# Patient Record
Sex: FEMALE | Race: WHITE | URBAN - METROPOLITAN AREA
[De-identification: names, ages, dates, MRNs, and addresses within clinical notes are randomized per-mention and may not be internally consistent; named-entity substitution may affect disease eponyms.]

---

## 2019-08-29 ENCOUNTER — APPOINTMENT (RX ONLY)
Dept: URBAN - METROPOLITAN AREA CLINIC 96 | Facility: CLINIC | Age: 59
Setting detail: DERMATOLOGY
End: 2019-08-29

## 2019-08-29 DIAGNOSIS — L81.4 OTHER MELANIN HYPERPIGMENTATION: ICD-10-CM

## 2019-08-29 DIAGNOSIS — L82.1 OTHER SEBORRHEIC KERATOSIS: ICD-10-CM

## 2019-08-29 DIAGNOSIS — L57.0 ACTINIC KERATOSIS: ICD-10-CM

## 2019-08-29 DIAGNOSIS — D22 MELANOCYTIC NEVI: ICD-10-CM

## 2019-08-29 DIAGNOSIS — D18.0 HEMANGIOMA: ICD-10-CM

## 2019-08-29 PROBLEM — D48.5 NEOPLASM OF UNCERTAIN BEHAVIOR OF SKIN: Status: ACTIVE | Noted: 2019-08-29

## 2019-08-29 PROBLEM — D18.01 HEMANGIOMA OF SKIN AND SUBCUTANEOUS TISSUE: Status: ACTIVE | Noted: 2019-08-29

## 2019-08-29 PROBLEM — D22.5 MELANOCYTIC NEVI OF TRUNK: Status: ACTIVE | Noted: 2019-08-29

## 2019-08-29 PROCEDURE — 17000 DESTRUCT PREMALG LESION: CPT | Mod: 59

## 2019-08-29 PROCEDURE — 11102 TANGNTL BX SKIN SINGLE LES: CPT

## 2019-08-29 PROCEDURE — ? COUNSELING

## 2019-08-29 PROCEDURE — ? DEFER

## 2019-08-29 PROCEDURE — ? SUNSCREEN RECOMMENDATIONS

## 2019-08-29 PROCEDURE — 11103 TANGNTL BX SKIN EA SEP/ADDL: CPT

## 2019-08-29 PROCEDURE — ? LIQUID NITROGEN

## 2019-08-29 PROCEDURE — 17003 DESTRUCT PREMALG LES 2-14: CPT

## 2019-08-29 PROCEDURE — ? BIOPSY BY SHAVE METHOD

## 2019-08-29 PROCEDURE — 99202 OFFICE O/P NEW SF 15 MIN: CPT | Mod: 25

## 2019-08-29 ASSESSMENT — LOCATION DETAILED DESCRIPTION DERM
LOCATION DETAILED: NASAL TIP
LOCATION DETAILED: UPPER STERNUM
LOCATION DETAILED: NASAL DORSUM
LOCATION DETAILED: RIGHT DISTAL POSTERIOR UPPER ARM
LOCATION DETAILED: SUPERIOR MID FOREHEAD
LOCATION DETAILED: RIGHT CENTRAL MALAR CHEEK

## 2019-08-29 ASSESSMENT — LOCATION SIMPLE DESCRIPTION DERM
LOCATION SIMPLE: SUPERIOR FOREHEAD
LOCATION SIMPLE: RIGHT POSTERIOR UPPER ARM
LOCATION SIMPLE: CHEST
LOCATION SIMPLE: NOSE
LOCATION SIMPLE: RIGHT CHEEK

## 2019-08-29 ASSESSMENT — LOCATION ZONE DERM
LOCATION ZONE: TRUNK
LOCATION ZONE: FACE
LOCATION ZONE: NOSE
LOCATION ZONE: ARM

## 2019-09-07 ENCOUNTER — APPOINTMENT (RX ONLY)
Dept: URBAN - METROPOLITAN AREA CLINIC 96 | Facility: CLINIC | Age: 59
Setting detail: DERMATOLOGY
End: 2019-09-07

## 2019-09-07 PROBLEM — C44.612 BASAL CELL CARCINOMA OF SKIN OF RIGHT UPPER LIMB, INCLUDING SHOULDER: Status: ACTIVE | Noted: 2019-09-07

## 2019-09-07 PROCEDURE — ? EXCISION

## 2019-09-07 PROCEDURE — ? PRESCRIPTION

## 2019-09-07 PROCEDURE — 13121 CMPLX RPR S/A/L 2.6-7.5 CM: CPT | Mod: 79

## 2019-09-07 PROCEDURE — 11603 EXC TR-EXT MAL+MARG 2.1-3 CM: CPT | Mod: 79

## 2019-09-07 RX ORDER — CEFADROXIL 500 MG/1
CAPSULE ORAL
Qty: 14 | Refills: 0 | Status: ERX | COMMUNITY
Start: 2019-09-07

## 2019-09-07 RX ADMIN — CEFADROXIL: 500 CAPSULE ORAL at 00:00

## 2019-09-12 ENCOUNTER — APPOINTMENT (RX ONLY)
Dept: URBAN - METROPOLITAN AREA CLINIC 96 | Facility: CLINIC | Age: 59
Setting detail: DERMATOLOGY
End: 2019-09-12

## 2019-09-12 PROBLEM — D48.5 NEOPLASM OF UNCERTAIN BEHAVIOR OF SKIN: Status: ACTIVE | Noted: 2019-09-12

## 2019-09-12 PROCEDURE — ? BIOPSY BY SHAVE METHOD

## 2019-09-12 PROCEDURE — 11102 TANGNTL BX SKIN SINGLE LES: CPT | Mod: 79

## 2019-09-12 PROCEDURE — 11103 TANGNTL BX SKIN EA SEP/ADDL: CPT | Mod: 79

## 2019-09-12 NOTE — HPI: SKIN LESIONS
How Severe Is Your Skin Lesion?: moderate
Have Your Skin Lesions Been Treated?: not been treated
Is This A New Presentation, Or A Follow-Up?: Skin Lesions
Additional History: Hx of bcc

## 2019-09-12 NOTE — PROCEDURE: BIOPSY BY SHAVE METHOD
Billing Type: Third-Party Bill
Render In Bullet Format When Appropriate: No
Biopsy Method: Dermablade
Hemostasis: Drysol and Portable Heat Cautery
Was A Bandage Applied: Yes
Silver Nitrate Text: The wound bed was treated with silver nitrate after the biopsy was performed.
Body Location Override (Optional - Billing Will Still Be Based On Selected Body Map Location If Applicable): right lateral distal pretibial region
Dressing: bandage
Anesthesia Volume In Cc (Will Not Render If 0): 0.5
Type Of Destruction Used: Curettage
Electrodesiccation And Curettage Text: The wound bed was treated with electrodesiccation and curettage after the biopsy was performed.
Biopsy Type: H and E
Consent: Written consent was obtained and risks were reviewed including but not limited to scarring, infection, bleeding, scabbing, incomplete removal, nerve damage and allergy to anesthesia.
Electrodesiccation Text: The wound bed was treated with electrodesiccation after the biopsy was performed.
Depth Of Biopsy: dermis
Wound Care: Petrolatum
Notification Instructions: Patient will be notified of biopsy results. However, patient instructed to call the office if not contacted within 2 weeks.
Size Of Lesion In Cm: 0.9
Additional Anesthesia Volume In Cc (Will Not Render If 0): 0
Cryotherapy Text: The wound bed was treated with cryotherapy after the biopsy was performed.
Lab Facility: 3
Anesthesia Type: 1% lidocaine with epinephrine
Post-Care Instructions: I reviewed with the patient in detail post-care instructions. Patient is to keep the biopsy site dry overnight, and then apply vaseline twice daily until healed. Patient may apply hydrogen peroxide soaks to remove any crusting.
Detail Level: Detailed
Lab: 6
Curettage Text: The wound bed was treated with curettage after the biopsy was performed.
X Size Of Lesion In Cm: 0.6
Size Of Lesion In Cm: 0.7
Body Location Override (Optional - Billing Will Still Be Based On Selected Body Map Location If Applicable): Right distal pretibial

## 2019-09-20 ENCOUNTER — APPOINTMENT (RX ONLY)
Dept: URBAN - METROPOLITAN AREA CLINIC 96 | Facility: CLINIC | Age: 59
Setting detail: DERMATOLOGY
End: 2019-09-20

## 2019-09-20 DIAGNOSIS — Z48.02 ENCOUNTER FOR REMOVAL OF SUTURES: ICD-10-CM

## 2019-09-20 PROBLEM — C44.319 BASAL CELL CARCINOMA OF SKIN OF OTHER PARTS OF FACE: Status: ACTIVE | Noted: 2019-09-20

## 2019-09-20 PROCEDURE — 13132 CMPLX RPR F/C/C/M/N/AX/G/H/F: CPT

## 2019-09-20 PROCEDURE — 17311 MOHS 1 STAGE H/N/HF/G: CPT

## 2019-09-20 PROCEDURE — ? MOHS SURGERY

## 2019-09-20 PROCEDURE — 99024 POSTOP FOLLOW-UP VISIT: CPT

## 2019-09-20 PROCEDURE — ? SUTURE REMOVAL (GLOBAL PERIOD)

## 2019-09-20 PROCEDURE — ? PRESCRIPTION

## 2019-09-20 RX ORDER — IBUPROFEN 800 MG/1
TABLET, FILM COATED ORAL
Qty: 20 | Refills: 0 | Status: ERX | COMMUNITY
Start: 2019-09-20

## 2019-09-20 RX ORDER — DOXYCYCLINE HYCLATE 100 MG/1
CAPSULE, GELATIN COATED ORAL
Qty: 7 | Refills: 0 | Status: ERX | COMMUNITY
Start: 2019-09-20

## 2019-09-20 RX ADMIN — IBUPROFEN: 800 TABLET, FILM COATED ORAL at 00:00

## 2019-09-20 RX ADMIN — DOXYCYCLINE HYCLATE: 100 CAPSULE, GELATIN COATED ORAL at 00:00

## 2019-09-20 ASSESSMENT — LOCATION ZONE DERM: LOCATION ZONE: ARM

## 2019-09-20 ASSESSMENT — LOCATION SIMPLE DESCRIPTION DERM: LOCATION SIMPLE: RIGHT SHOULDER

## 2019-09-20 ASSESSMENT — LOCATION DETAILED DESCRIPTION DERM: LOCATION DETAILED: RIGHT ANTERIOR SHOULDER

## 2019-09-20 NOTE — PROCEDURE: SUTURE REMOVAL (GLOBAL PERIOD)
Add 14090 Cpt? (Important Note: In 2017 The Use Of 98577 Is Being Tracked By Cms To Determine Future Global Period Reimbursement For Global Periods): yes
Detail Level: Detailed

## 2019-09-20 NOTE — PROCEDURE: MOHS SURGERY

## 2019-09-27 ENCOUNTER — APPOINTMENT (RX ONLY)
Dept: URBAN - METROPOLITAN AREA CLINIC 96 | Facility: CLINIC | Age: 59
Setting detail: DERMATOLOGY
End: 2019-09-27

## 2019-09-27 PROBLEM — C44.329 SQUAMOUS CELL CARCINOMA OF SKIN OF OTHER PARTS OF FACE: Status: ACTIVE | Noted: 2019-09-27

## 2019-09-27 PROCEDURE — ? PRESCRIPTION

## 2019-09-27 PROCEDURE — ? MOHS SURGERY

## 2019-09-27 PROCEDURE — 13131 CMPLX RPR F/C/C/M/N/AX/G/H/F: CPT | Mod: 79

## 2019-09-27 PROCEDURE — 17311 MOHS 1 STAGE H/N/HF/G: CPT | Mod: 79

## 2019-09-27 RX ORDER — DOXYCYCLINE 100 MG/1
TABLET, FILM COATED ORAL QD
Qty: 7 | Refills: 0 | Status: ERX | COMMUNITY
Start: 2019-09-27

## 2019-09-27 RX ORDER — IBUPROFEN 800 MG/1
TABLET ORAL BID PRN
Qty: 20 | Refills: 0 | Status: ERX | COMMUNITY
Start: 2019-09-27

## 2019-09-27 RX ADMIN — DOXYCYCLINE: 100 TABLET, FILM COATED ORAL at 13:25

## 2019-09-27 RX ADMIN — IBUPROFEN: 800 TABLET ORAL at 13:25

## 2019-09-27 NOTE — PROCEDURE: MOHS SURGERY

## 2019-10-24 ENCOUNTER — RX ONLY (OUTPATIENT)
Age: 59
Setting detail: RX ONLY
End: 2019-10-24

## 2019-10-24 RX ORDER — IMIQUIMOD 50 MG/G
CREAM TOPICAL
Qty: 2 | Refills: 1 | Status: ERX | COMMUNITY
Start: 2019-10-24

## 2019-10-29 ENCOUNTER — APPOINTMENT (RX ONLY)
Dept: URBAN - METROPOLITAN AREA CLINIC 96 | Facility: CLINIC | Age: 59
Setting detail: DERMATOLOGY
End: 2019-10-29

## 2019-10-29 PROBLEM — C44.519 BASAL CELL CARCINOMA OF SKIN OF OTHER PART OF TRUNK: Status: ACTIVE | Noted: 2019-10-29

## 2019-10-29 PROCEDURE — 11602 EXC TR-EXT MAL+MARG 1.1-2 CM: CPT

## 2019-10-29 PROCEDURE — ? PRESCRIPTION

## 2019-10-29 PROCEDURE — ? EXCISION

## 2019-10-29 PROCEDURE — 13101 CMPLX RPR TRUNK 2.6-7.5 CM: CPT

## 2019-10-29 RX ORDER — DOXYCYCLINE HYCLATE 100 MG/1
CAPSULE, GELATIN COATED ORAL
Qty: 7 | Refills: 0 | Status: ERX

## 2019-10-29 NOTE — PROCEDURE: EXCISION
Body Location Override (Optional - Billing Will Still Be Based On Selected Body Map Location If Applicable): Right Clavicular Skin

## 2019-11-12 ENCOUNTER — APPOINTMENT (RX ONLY)
Dept: URBAN - METROPOLITAN AREA CLINIC 96 | Facility: CLINIC | Age: 59
Setting detail: DERMATOLOGY
End: 2019-11-12

## 2019-11-12 DIAGNOSIS — Z48.02 ENCOUNTER FOR REMOVAL OF SUTURES: ICD-10-CM

## 2019-11-12 PROCEDURE — ? SUTURE REMOVAL (GLOBAL PERIOD)

## 2019-11-12 PROCEDURE — 99024 POSTOP FOLLOW-UP VISIT: CPT

## 2019-11-12 ASSESSMENT — LOCATION DETAILED DESCRIPTION DERM: LOCATION DETAILED: RIGHT CLAVICULAR SKIN

## 2019-11-12 ASSESSMENT — LOCATION ZONE DERM: LOCATION ZONE: TRUNK

## 2019-11-12 ASSESSMENT — LOCATION SIMPLE DESCRIPTION DERM: LOCATION SIMPLE: RIGHT CLAVICULAR SKIN

## 2019-11-12 NOTE — PROCEDURE: SUTURE REMOVAL (GLOBAL PERIOD)
Detail Level: Detailed
Add 73261 Cpt? (Important Note: In 2017 The Use Of 70073 Is Being Tracked By Cms To Determine Future Global Period Reimbursement For Global Periods): yes

## 2020-05-04 ENCOUNTER — APPOINTMENT (RX ONLY)
Dept: URBAN - METROPOLITAN AREA CLINIC 96 | Facility: CLINIC | Age: 60
Setting detail: DERMATOLOGY
End: 2020-05-04

## 2020-05-04 DIAGNOSIS — L81.4 OTHER MELANIN HYPERPIGMENTATION: ICD-10-CM

## 2020-05-04 DIAGNOSIS — Z85.828 PERSONAL HISTORY OF OTHER MALIGNANT NEOPLASM OF SKIN: ICD-10-CM

## 2020-05-04 DIAGNOSIS — D18.0 HEMANGIOMA: ICD-10-CM

## 2020-05-04 DIAGNOSIS — L57.0 ACTINIC KERATOSIS: ICD-10-CM

## 2020-05-04 DIAGNOSIS — D22 MELANOCYTIC NEVI: ICD-10-CM

## 2020-05-04 DIAGNOSIS — L82.1 OTHER SEBORRHEIC KERATOSIS: ICD-10-CM

## 2020-05-04 PROBLEM — D22.5 MELANOCYTIC NEVI OF TRUNK: Status: ACTIVE | Noted: 2020-05-04

## 2020-05-04 PROBLEM — D18.01 HEMANGIOMA OF SKIN AND SUBCUTANEOUS TISSUE: Status: ACTIVE | Noted: 2020-05-04

## 2020-05-04 PROBLEM — C44.712 BASAL CELL CARCINOMA OF SKIN OF RIGHT LOWER LIMB, INCLUDING HIP: Status: ACTIVE | Noted: 2020-05-04

## 2020-05-04 PROCEDURE — 17003 DESTRUCT PREMALG LES 2-14: CPT

## 2020-05-04 PROCEDURE — ? FULL BODY SKIN EXAM

## 2020-05-04 PROCEDURE — ? SUNSCREEN RECOMMENDATIONS

## 2020-05-04 PROCEDURE — ? LIQUID NITROGEN

## 2020-05-04 PROCEDURE — 17000 DESTRUCT PREMALG LESION: CPT

## 2020-05-04 PROCEDURE — ? COUNSELING

## 2020-05-04 PROCEDURE — 99214 OFFICE O/P EST MOD 30 MIN: CPT | Mod: 25

## 2020-05-04 PROCEDURE — ? ADDITIONAL NOTES

## 2020-05-04 PROCEDURE — ? CONSULTATION FOR MOHS SURGERY

## 2020-05-04 ASSESSMENT — LOCATION ZONE DERM
LOCATION ZONE: TRUNK
LOCATION ZONE: FACE
LOCATION ZONE: NOSE
LOCATION ZONE: EAR

## 2020-05-04 ASSESSMENT — LOCATION DETAILED DESCRIPTION DERM
LOCATION DETAILED: RIGHT NASAL SIDEWALL
LOCATION DETAILED: LEFT NASAL SIDEWALL
LOCATION DETAILED: UPPER STERNUM
LOCATION DETAILED: RIGHT SUPERIOR HELIX
LOCATION DETAILED: LEFT INFERIOR MEDIAL FOREHEAD
LOCATION DETAILED: RIGHT INFERIOR FOREHEAD

## 2020-05-04 ASSESSMENT — LOCATION SIMPLE DESCRIPTION DERM
LOCATION SIMPLE: RIGHT FOREHEAD
LOCATION SIMPLE: LEFT FOREHEAD
LOCATION SIMPLE: RIGHT NOSE
LOCATION SIMPLE: CHEST
LOCATION SIMPLE: RIGHT EAR
LOCATION SIMPLE: LEFT NOSE

## 2020-05-04 NOTE — PROCEDURE: LIQUID NITROGEN
Number Of Freeze-Thaw Cycles: 1 freeze-thaw cycle
Post-Care Instructions: I reviewed with the patient in detail post-care instructions. Patient is to wear sunprotection, and avoid picking at any of the treated lesions. Pt may apply Vaseline to crusted or scabbing areas.
Detail Level: Zone
Duration Of Freeze Thaw-Cycle (Seconds): 10
Consent: The patient's consent was obtained including but not limited to risks of crusting, scabbing, blistering, scarring, darker or lighter pigmentary change, recurrence, incomplete removal and infection.
Render Post-Care Instructions In Note?: no

## 2020-05-04 NOTE — PROCEDURE: ADDITIONAL NOTES
Detail Level: Simple
Additional Notes: Patient will schedule Mohs with Dr. Cox in 2 weeks
Additional Notes: Patient consent was obtained to proceed with the visit and recommended plan of care after discussion of all risks and benefits, including the risks of COVID-19 exposure.

## 2020-05-20 ENCOUNTER — APPOINTMENT (RX ONLY)
Dept: URBAN - METROPOLITAN AREA CLINIC 96 | Facility: CLINIC | Age: 60
Setting detail: DERMATOLOGY
End: 2020-05-20

## 2020-05-20 VITALS — TEMPERATURE: 98.1 F

## 2020-05-20 PROBLEM — C44.712 BASAL CELL CARCINOMA OF SKIN OF RIGHT LOWER LIMB, INCLUDING HIP: Status: ACTIVE | Noted: 2020-05-20

## 2020-05-20 PROCEDURE — ? PRESCRIPTION

## 2020-05-20 PROCEDURE — 17313 MOHS 1 STAGE T/A/L: CPT

## 2020-05-20 PROCEDURE — ? REPAIR NOTE

## 2020-05-20 PROCEDURE — 13121 CMPLX RPR S/A/L 2.6-7.5 CM: CPT

## 2020-05-20 PROCEDURE — ? MOHS SURGERY

## 2020-05-20 NOTE — PROCEDURE: MOHS SURGERY

## 2020-05-20 NOTE — PROCEDURE: REPAIR NOTE
7/2/10 post op L TKR O-T Plasty Text: The defect edges were debeveled with a #15 scalpel blade.  Given the location of the defect, shape of the defect and the proximity to free margins an O-T plasty was deemed most appropriate.  Using a sterile surgical marker, an appropriate O-T plasty was drawn incorporating the defect and placing the expected incisions within the relaxed skin tension lines where possible.    The area thus outlined was incised deep to adipose tissue with a #15 scalpel blade.  The skin margins were undermined to an appropriate distance in all directions utilizing iris scissors.

## 2020-05-20 NOTE — PROCEDURE: MOHS SURGERY
known (describe)/dogs fighting for toy Suturegard Intro: Intraoperative tissue expansion was performed, utilizing the SUTUREGARD device, in order to reduce wound tension.

## 2021-06-13 NOTE — PROCEDURE: EXCISION
Office Visit - Follow Up   Xiomy Gan   67 y.o. female    Date of Visit: 10/5/2017    Chief Complaint   Patient presents with     irregular heart beat        Assessment and Plan   1. Palpitations in woman with history of coronary artery disease  I believe she is experiencing either PACs or PVCs based on both her history and exam findings.  Echocardiogram less than 2 years ago showing normal left ventricular systolic function and no significant valvular abnormalities at the time when she was also having palpitations.  Recent increased dose of Synthroid might be contributing but her TSH was in the therapeutic range.  We discussed the role of caffeine and stress.  I will also check her electrolytes to make sure no derangement and will make arrangements for a 24-hour Holter monitor to rule out any other cause of her symptoms.  In the absence of other symptoms, I would not repeat an echocardiogram at this time.  She will continue current dose of Toprol-XL for now.  - Potassium  - Magnesium  - Holter Monitor; Future    No Follow-up on file.     History of Present Illness   This 67 y.o. old woman with history of coronary artery disease who is here with palpitations over the past 2 weeks.  She describes her heart is feeling irregular as if it is skipping beats.  This is been present for the past 10-14 days.  No associated chest pain, dyspnea, or feeling lightheaded or dizzy.  Denies any exertional chest pain and exercises on a regular basis including this morning without difficulty.  She notices the symptoms most often at night when she is lying down in a quiet state.  She had similar symptoms 2 years ago and workup at that time included a 24 hour Holter monitor showing frequent PACs and an echocardiogram showing normal left ventricular systolic function and no significant valvular abnormalities.  She denies any new medications.  She has not changed her caffeine intake.  Denies any new stress.  Her Synthroid dose  was increased a few months ago but her recent TSH on  was still in the therapeutic range.  She already takes Toprol-XL 50 mg daily.    Review of Systems:  Otherwise, a comprehensive review of systems was negative except as noted.     Medications, Allergies and Problem List   Patient Active Problem List   Diagnosis     Urinary Frequency More Than Twice At Night (Nocturia)     Hypertension     Diffuse Joint Pains (Arthralgias)     Cholelithiasis     Hypothyroidism     Acute Myocardial Infarction Of The Inferior Wall     Vitamin D Deficiency     Hyperlipidemia     Coronary Artery Disease     Carotid Artery Stenosis     Osteopenia     Osteoarthrosis Of Multiple Sites     Ganglion cyst of flexor tendon sheath of finger, right     Chondrocalcinosis     Greater trochanteric bursitis of both hips     Palpitations       She has a past surgical history that includes knee scope,diagnostic;  delivery only; Breast cyst aspiration (Left, y-8); Tubal ligation; and Laparoscopic cholecystectomy (N/A, 2015).    No Known Allergies    Current Outpatient Prescriptions   Medication Sig Dispense Refill     acetaminophen (TYLENOL) 500 MG tablet Take 1,000 mg by mouth every 6 (six) hours as needed.       ASPIRIN (ASPIR-81 ORAL) Take 1 tablet by mouth daily.       b complex vitamins tablet Take 1 tablet by mouth daily.       CALCIUM-MAGNESIUM-VITAMIN D2 ORAL Take by mouth daily. Calcium-magnesium-vitamin D 200-100-33.3 MG-MG-unit oral capsule       cholecalciferol, vitamin D3, (VITAMIN D3) 2,000 unit cap Take 1 capsule by mouth daily.       coenzyme Q10 (CO Q-10) 10 mg capsule Take 400 mg by mouth daily.        DOCOSAHEXANOIC ACID/EPA (FISH OIL ORAL) Take 2 tablets by mouth daily.        ESTRACE 0.01 % (0.1 mg/gram) vaginal cream INSERT 1 GRAM VAGINALLY TWICE WEEKLY 42.5 g 0     GLUC/AMENA-MSM#1/C/KATELYN/MELLY/BOR (OSTEO BI-FLEX TRIPLE STRENGTH ORAL) Take 2 tablets by mouth daily.       levothyroxine (SYNTHROID,  "LEVOTHROID) 112 MCG tablet TAKE ONE TABLET BY MOUTH EVERY MORNING AT 6AM 90 tablet 1     lisinopril (PRINIVIL,ZESTRIL) 10 MG tablet TAKE ONE TABLET BY MOUTH ONE TIME DAILY 90 tablet 1     metoprolol succinate (TOPROL-XL) 50 MG 24 hr tablet TAKE ONE TABLET BY MOUTH ONE TIME DAILY 90 tablet 2     multivitamin (MULTIVITAMIN) per tablet Take 1 tablet by mouth daily.       omeprazole (PRILOSEC) 20 MG capsule Take 1 capsule (20 mg total) by mouth daily. Prn 90 capsule 3     rosuvastatin (CRESTOR) 40 MG tablet TAKE ONE TABLET BY MOUTH ONE TIME DAILY 90 tablet 2     traMADol (ULTRAM) 50 mg tablet Take 1 tablet (50 mg total) by mouth every 8 (eight) hours as needed. 90 tablet 1     No current facility-administered medications for this visit.         Physical Exam   General Appearance:   Well-appearing middle-aged woman    /72 (Patient Site: Left Arm, Patient Position: Sitting, Cuff Size: Adult Regular)  Pulse 82  Ht 5' 0.25\" (1.53 m)  Wt 153 lb (69.4 kg)  LMP 06/24/1999  SpO2 96%  BMI 29.63 kg/m2    HEENT: Normal  Respiratory: Normal respiratory effort.  Lungs are clear with no rales or wheezes.  Heart: Regular rate and rhythm with frequent ectopic beats without murmurs, rubs, or gallops.  No carotid bruits.  Extremities: No peripheral edema.  Neurologic: Grossly nonfocal  Skin: No cyanosis or pallor           Additional Information   Social History   Substance Use Topics     Smoking status: Former Smoker     Packs/day: 0.50     Years: 20.00     Quit date: 1/1/1985     Smokeless tobacco: Never Used     Alcohol use 1.2 - 1.8 oz/week     2 - 3 Cans of beer per week         Review and/or order of clinical lab tests: We will check magnesium and potassium    Review and/or order of medicine tests: Ordering 24-hour Holter monitor.  Reviewed her last echocardiogram December 2015 showing normal left ventricular systolic function without significant valvular abnormalities    Review and summarization of old records and/or " obtaining history from someone other than the patient and.or discussion of case with another health care provider: Reviewed records from her usual PCP including December 2015 visit when she presented with palpitations.  24-hour Holter monitor obtained at that time along with echocardiogram.      Time: total time spent with the patient was 25 minutes of which >50% was spent in counseling and coordination of care     Samuel Tillman MD   Banner Transposition Flap Text: The defect edges were debeveled with a #15 scalpel blade.  Given the location of the defect and the proximity to free margins a Banner transposition flap was deemed most appropriate.  Using a sterile surgical marker, an appropriate flap drawn around the defect. The area thus outlined was incised deep to adipose tissue with a #15 scalpel blade.  The skin margins were undermined to an appropriate distance in all directions utilizing iris scissors.

## 2021-09-12 NOTE — PROCEDURE: MOHS SURGERY
Warm Dorsal Nasal Flap Text: The defect edges were debeveled with a #15 scalpel blade.  Given the location of the defect and the proximity to free margins a dorsal nasal flap was deemed most appropriate.  Using a sterile surgical marker, an appropriate dorsal nasal flap was drawn around the defect.    The area thus outlined was incised deep to adipose tissue with a #15 scalpel blade.  The skin margins were undermined to an appropriate distance in all directions utilizing iris scissors.

## 2021-10-27 NOTE — PROCEDURE: EXCISION
Oxybutynin Pregnancy And Lactation Text: This medication is Pregnancy Category B and is considered safe during pregnancy. It is unknown if it is excreted in breast milk. Excisional Biopsy Additional Text (Leave Blank If You Do Not Want): The margin was drawn around the clinically apparent lesion. An elliptical shape was then drawn on the skin incorporating the lesion and margins.  Incisions were then made along these lines to the appropriate tissue plane and the lesion was extirpated.

## 2022-02-18 NOTE — PROCEDURE: EXCISION
no Complex Repair And V-Y Plasty Text: The defect edges were debeveled with a #15 scalpel blade.  The primary defect was closed partially with a complex linear closure.  Given the location of the remaining defect, shape of the defect and the proximity to free margins a V-Y plasty was deemed most appropriate for complete closure of the defect.  Using a sterile surgical marker, an appropriate advancement flap was drawn incorporating the defect and placing the expected incisions within the relaxed skin tension lines where possible.    The area thus outlined was incised deep to adipose tissue with a #15 scalpel blade.  The skin margins were undermined to an appropriate distance in all directions utilizing iris scissors.

## 2022-04-04 NOTE — PROCEDURE: MOHS SURGERY
Simple: Patient demonstrates quick and easy understanding Graft Cartilage Fenestration Text: The cartilage was fenestrated with a 2mm punch biopsy to help facilitate graft survival and healing.

## 2022-07-19 NOTE — PROCEDURE: MOHS SURGERY
Stable   Continue PO amiodarone     Otolaryngologist Procedure Text (E): After obtaining clear surgical margins the patient was sent to otolaryngology for surgical repair.  The patient understands they will receive post-surgical care and follow-up from the referring physician's office.

## 2023-08-03 NOTE — PROCEDURE: MOHS SURGERY
2943 Mohs Histo Method Verbiage: Each section was then chromacoded and processed in the Mohs lab using the Mohs protocol and submitted for frozen section.

## 2024-05-03 NOTE — PROCEDURE: MOHS SURGERY
used Complex Repair And Graft Additional Text (Will Appearing After The Standard Complex Repair Text): The complex repair was not sufficient to completely close the primary defect. The remaining additional defect was repaired with the graft mentioned below.

## 2024-08-13 NOTE — PROCEDURE: MOHS SURGERY
Price (Do Not Change): 0.00 Instructions: This plan will send the code FBSE to the PM system.  DO NOT or CHANGE the price. Detail Level: Simple Pain Refusal Text: I offered to prescribe pain medication but the patient refused to take this medication.